# Patient Record
Sex: FEMALE | Race: WHITE | Employment: FULL TIME | URBAN - METROPOLITAN AREA
[De-identification: names, ages, dates, MRNs, and addresses within clinical notes are randomized per-mention and may not be internally consistent; named-entity substitution may affect disease eponyms.]

---

## 2024-04-03 ENCOUNTER — TELEPHONE (OUTPATIENT)
Dept: NEUROSURGERY | Facility: CLINIC | Age: 34
End: 2024-04-03

## 2024-06-13 ENCOUNTER — APPOINTMENT (OUTPATIENT)
Dept: CARDIOLOGY | Facility: HOSPITAL | Age: 34
End: 2024-06-13
Payer: MEDICARE

## 2024-06-13 ENCOUNTER — HOSPITAL ENCOUNTER (EMERGENCY)
Facility: HOSPITAL | Age: 34
Discharge: HOME | End: 2024-06-13
Attending: INTERNAL MEDICINE
Payer: MEDICARE

## 2024-06-13 VITALS
TEMPERATURE: 96.3 F | WEIGHT: 230 LBS | DIASTOLIC BLOOD PRESSURE: 65 MMHG | RESPIRATION RATE: 22 BRPM | HEIGHT: 63 IN | BODY MASS INDEX: 40.75 KG/M2 | HEART RATE: 93 BPM | OXYGEN SATURATION: 96 % | SYSTOLIC BLOOD PRESSURE: 129 MMHG

## 2024-06-13 DIAGNOSIS — M54.12 RIGHT CERVICAL RADICULOPATHY: ICD-10-CM

## 2024-06-13 DIAGNOSIS — M79.601 RIGHT ARM PAIN: Primary | ICD-10-CM

## 2024-06-13 PROCEDURE — 2500000004 HC RX 250 GENERAL PHARMACY W/ HCPCS (ALT 636 FOR OP/ED): Performed by: INTERNAL MEDICINE

## 2024-06-13 PROCEDURE — 93005 ELECTROCARDIOGRAM TRACING: CPT

## 2024-06-13 PROCEDURE — 96372 THER/PROPH/DIAG INJ SC/IM: CPT | Performed by: INTERNAL MEDICINE

## 2024-06-13 PROCEDURE — 99283 EMERGENCY DEPT VISIT LOW MDM: CPT

## 2024-06-13 RX ORDER — DIAZEPAM 5 MG/ML
5 INJECTION, SOLUTION INTRAMUSCULAR; INTRAVENOUS ONCE
Status: COMPLETED | OUTPATIENT
Start: 2024-06-13 | End: 2024-06-13

## 2024-06-13 RX ORDER — PREDNISONE 50 MG/1
50 TABLET ORAL DAILY
Qty: 5 TABLET | Refills: 0 | Status: SHIPPED | OUTPATIENT
Start: 2024-06-13 | End: 2024-06-18

## 2024-06-13 RX ORDER — KETOROLAC TROMETHAMINE 30 MG/ML
30 INJECTION, SOLUTION INTRAMUSCULAR; INTRAVENOUS ONCE
Status: COMPLETED | OUTPATIENT
Start: 2024-06-13 | End: 2024-06-13

## 2024-06-13 RX ORDER — METHOCARBAMOL 500 MG/1
1000 TABLET, FILM COATED ORAL 3 TIMES DAILY PRN
Qty: 20 TABLET | Refills: 0 | Status: SHIPPED | OUTPATIENT
Start: 2024-06-13

## 2024-06-13 RX ORDER — ACETAMINOPHEN 325 MG/1
975 TABLET ORAL ONCE
Status: COMPLETED | OUTPATIENT
Start: 2024-06-13 | End: 2024-06-13

## 2024-06-13 RX ADMIN — ACETAMINOPHEN 975 MG: 325 TABLET ORAL at 16:52

## 2024-06-13 RX ADMIN — KETOROLAC TROMETHAMINE 30 MG: 30 INJECTION, SOLUTION INTRAMUSCULAR at 16:53

## 2024-06-13 RX ADMIN — DIAZEPAM 5 MG: 5 INJECTION, SOLUTION INTRAMUSCULAR; INTRAVENOUS at 16:56

## 2024-06-13 ASSESSMENT — PAIN DESCRIPTION - ONSET: ONSET: SUDDEN

## 2024-06-13 ASSESSMENT — COLUMBIA-SUICIDE SEVERITY RATING SCALE - C-SSRS
2. HAVE YOU ACTUALLY HAD ANY THOUGHTS OF KILLING YOURSELF?: NO
6. HAVE YOU EVER DONE ANYTHING, STARTED TO DO ANYTHING, OR PREPARED TO DO ANYTHING TO END YOUR LIFE?: NO
1. IN THE PAST MONTH, HAVE YOU WISHED YOU WERE DEAD OR WISHED YOU COULD GO TO SLEEP AND NOT WAKE UP?: NO

## 2024-06-13 ASSESSMENT — PAIN DESCRIPTION - PAIN TYPE: TYPE: ACUTE PAIN

## 2024-06-13 ASSESSMENT — PAIN - FUNCTIONAL ASSESSMENT: PAIN_FUNCTIONAL_ASSESSMENT: 0-10

## 2024-06-13 ASSESSMENT — PAIN DESCRIPTION - ORIENTATION: ORIENTATION: LEFT

## 2024-06-13 ASSESSMENT — PAIN DESCRIPTION - LOCATION: LOCATION: ARM

## 2024-06-13 ASSESSMENT — PAIN DESCRIPTION - PROGRESSION: CLINICAL_PROGRESSION: NOT CHANGED

## 2024-06-13 ASSESSMENT — PAIN DESCRIPTION - DESCRIPTORS: DESCRIPTORS: BURNING;SHARP

## 2024-06-13 ASSESSMENT — PAIN SCALES - GENERAL: PAINLEVEL_OUTOF10: 10 - WORST POSSIBLE PAIN

## 2024-06-13 ASSESSMENT — PAIN DESCRIPTION - FREQUENCY: FREQUENCY: CONSTANT/CONTINUOUS

## 2024-06-13 NOTE — ED TRIAGE NOTES
Patient states ever since she took her Seroquel last night her left arm is in extreme pain, burning with sharp pains

## 2024-06-13 NOTE — ED PROVIDER NOTES
HPI   Chief Complaint   Patient presents with    Arm Injury     Patient states ever since she took her Seroquel last night her left arm is in extreme pain, burning with sharp pains       Patient presenting for evaluation of right arm pain.  Patient states she woke up around 5 AM with pain shooting down her right shoulder.  Patient states she feels it rating down to her right hand.  Patient denies recent falls or accidents.  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  Patient notes the pain is improved by laying her right arm down to her side.  Patient denies recent illness.  Denies chest pain.      History provided by:  Patient                      [place] Coma Scale Score: 15                     Patient History   Past Medical History:   Diagnosis Date    Personal history of other diseases of the musculoskeletal system and connective tissue 09/23/2017    History of low back pain     No past surgical history on file.  No family history on file.  Social History     Tobacco Use    Smoking status: Not on file    Smokeless tobacco: Not on file   Substance Use Topics    Alcohol use: Not on file    Drug use: Not on file       Physical Exam   ED Triage Vitals [06/13/24 1607]   Temperature Heart Rate Respirations BP   35.7 °C (96.3 °F) 93 (!) 22 129/65      Pulse Ox Temp Source Heart Rate Source Patient Position   96 % Skin Monitor Sitting      BP Location FiO2 (%)     Left arm --       Physical Exam  Vitals and nursing note reviewed.   Constitutional:       Appearance: Normal appearance.   HENT:      Head: Atraumatic.      Right Ear: External ear normal.      Left Ear: External ear normal.      Nose: Nose normal.      Mouth/Throat:      Mouth: Mucous membranes are moist.      Dentition: Abnormal dentition.   Eyes:      Extraocular Movements: Extraocular movements intact.      Pupils: Pupils are equal, round, and reactive to light.   Cardiovascular:      Rate and Rhythm: Normal rate and  regular rhythm.      Pulses: Normal pulses.   Pulmonary:      Effort: Pulmonary effort is normal.      Breath sounds: Normal breath sounds.   Abdominal:      Palpations: Abdomen is soft.      Tenderness: There is no abdominal tenderness.   Musculoskeletal:         General: No tenderness. Normal range of motion.      Cervical back: Normal range of motion and neck supple. Spasms present. No rigidity, tenderness or bony tenderness.      Thoracic back: No tenderness or bony tenderness.      Lumbar back: No bony tenderness.   Skin:     General: Skin is warm and dry.   Neurological:      General: No focal deficit present.      Mental Status: She is alert and oriented to person, place, and time. Mental status is at baseline.   Psychiatric:         Mood and Affect: Mood is anxious.         ED Course & MDM   ED Course as of 06/13/24 1843   Thu Jun 13, 2024   1746 Reevaluated patient.  Patient is resting comfortably.  Pain improved.  Patient indicates she is supposed to follow-up with neurosurgery because she has known nerve damage throughout her spine.  Patient agrees to follow-up with neurosurgery and return to ED if having worsening symptoms or other concerns. [JA]      ED Course User Index  [JA] Jeffrey Ruvalcaba DO         Diagnoses as of 06/13/24 1843   Right arm pain   Right cervical radiculopathy       Medical Decision Making  Differential diagnosis: Radiculopathy, muscle strain, MI, CVA, other    Patient presented for right arm pain.  Patient woke up with right arm pain shooting down her right arm to right hand.  Patient denies recent trauma.  Patient denies sleeping funny.  Denies recent illness.  Patient denies any possible pregnancy.  Patient has no neurodeficit on exam.  Sensation intact bilaterally.  Motor function is normal bilaterally.  Patient has known degenerative disc disease throughout her spine.  Pain improved in the ED with Toradol, acetaminophen, Valium.    On repeat exam patient continues to have no  neurodeficit.  Unclear etiology of pain although it is likely radicular pain.  No infectious source found in the ED.  Neurovascular intact distally in all 4 extremities.  Patient has follow-up with neurosurgery planned.  Provided follow-up information for neurosurgery again.  Will treat outpatient with prednisone and Robaxin for short course.  Patient agrees to return to ED if having worsening symptoms or other concerns.        Procedure  ECG 12 lead    Performed by: Jeffrey Ruvalcaba DO  Authorized by: Jeffrey Ruvalcaba DO    ECG interpreted by ED Physician in the absence of a cardiologist: yes    Comments:      6/13/2024 17: 05 sinus rhythm, rate 81, normal axis, ST segments normal, T waves normal, normal EKG.  EKG interpreted by myself.       Jeffrey Ruvalcaba DO  06/13/24 0244

## 2024-06-20 LAB
ATRIAL RATE: 81 BPM
P AXIS: 17 DEGREES
P OFFSET: 191 MS
P ONSET: 148 MS
PR INTERVAL: 134 MS
Q ONSET: 215 MS
QRS COUNT: 14 BEATS
QRS DURATION: 102 MS
QT INTERVAL: 396 MS
QTC CALCULATION(BAZETT): 460 MS
QTC FREDERICIA: 437 MS
R AXIS: 58 DEGREES
T AXIS: 44 DEGREES
T OFFSET: 413 MS
VENTRICULAR RATE: 81 BPM

## 2024-07-12 ENCOUNTER — APPOINTMENT (OUTPATIENT)
Dept: CARDIOLOGY | Facility: HOSPITAL | Age: 34
End: 2024-07-12
Payer: MEDICARE

## 2024-07-12 ENCOUNTER — APPOINTMENT (OUTPATIENT)
Dept: RADIOLOGY | Facility: HOSPITAL | Age: 34
End: 2024-07-12
Payer: MEDICARE

## 2024-07-12 ENCOUNTER — HOSPITAL ENCOUNTER (EMERGENCY)
Facility: HOSPITAL | Age: 34
Discharge: HOME | End: 2024-07-13
Attending: STUDENT IN AN ORGANIZED HEALTH CARE EDUCATION/TRAINING PROGRAM
Payer: MEDICARE

## 2024-07-12 DIAGNOSIS — F19.929 DRUG INTOXICATION WITH COMPLICATION (MULTI): ICD-10-CM

## 2024-07-12 DIAGNOSIS — R41.82 ALTERED MENTAL STATUS, UNSPECIFIED ALTERED MENTAL STATUS TYPE: Primary | ICD-10-CM

## 2024-07-12 LAB
ALBUMIN SERPL BCP-MCNC: 4.4 G/DL (ref 3.4–5)
ALP SERPL-CCNC: 77 U/L (ref 33–110)
ALT SERPL W P-5'-P-CCNC: 20 U/L (ref 7–45)
AMPHETAMINES UR QL SCN: ABNORMAL
ANION GAP SERPL CALC-SCNC: 22 MMOL/L (ref 10–20)
APAP SERPL-MCNC: <10 UG/ML
AST SERPL W P-5'-P-CCNC: 27 U/L (ref 9–39)
B-HCG SERPL-ACNC: <2 MIU/ML
BARBITURATES UR QL SCN: ABNORMAL
BASOPHILS # BLD AUTO: 0.05 X10*3/UL (ref 0–0.1)
BASOPHILS NFR BLD AUTO: 0.5 %
BENZODIAZ UR QL SCN: ABNORMAL
BILIRUB SERPL-MCNC: 0.8 MG/DL (ref 0–1.2)
BUN SERPL-MCNC: 21 MG/DL (ref 6–23)
BZE UR QL SCN: ABNORMAL
CALCIUM SERPL-MCNC: 8.9 MG/DL (ref 8.6–10.3)
CANNABINOIDS UR QL SCN: ABNORMAL
CHLORIDE SERPL-SCNC: 99 MMOL/L (ref 98–107)
CK SERPL-CCNC: 299 U/L (ref 0–215)
CO2 SERPL-SCNC: 19 MMOL/L (ref 21–32)
CREAT SERPL-MCNC: 2.11 MG/DL (ref 0.5–1.05)
EGFRCR SERPLBLD CKD-EPI 2021: 31 ML/MIN/1.73M*2
EOSINOPHIL # BLD AUTO: 0.02 X10*3/UL (ref 0–0.7)
EOSINOPHIL NFR BLD AUTO: 0.2 %
ERYTHROCYTE [DISTWIDTH] IN BLOOD BY AUTOMATED COUNT: 14.8 % (ref 11.5–14.5)
ETHANOL SERPL-MCNC: <10 MG/DL
FENTANYL+NORFENTANYL UR QL SCN: ABNORMAL
GLUCOSE SERPL-MCNC: 149 MG/DL (ref 74–99)
HCT VFR BLD AUTO: 35 % (ref 36–46)
HGB BLD-MCNC: 11.4 G/DL (ref 12–16)
IMM GRANULOCYTES # BLD AUTO: 0.1 X10*3/UL (ref 0–0.7)
IMM GRANULOCYTES NFR BLD AUTO: 1 % (ref 0–0.9)
LYMPHOCYTES # BLD AUTO: 1.66 X10*3/UL (ref 1.2–4.8)
LYMPHOCYTES NFR BLD AUTO: 16.5 %
MAGNESIUM SERPL-MCNC: 2.08 MG/DL (ref 1.6–2.4)
MCH RBC QN AUTO: 26.1 PG (ref 26–34)
MCHC RBC AUTO-ENTMCNC: 32.6 G/DL (ref 32–36)
MCV RBC AUTO: 80 FL (ref 80–100)
METHADONE UR QL SCN: ABNORMAL
MONOCYTES # BLD AUTO: 1.14 X10*3/UL (ref 0.1–1)
MONOCYTES NFR BLD AUTO: 11.3 %
NEUTROPHILS # BLD AUTO: 7.08 X10*3/UL (ref 1.2–7.7)
NEUTROPHILS NFR BLD AUTO: 70.5 %
NRBC BLD-RTO: 0 /100 WBCS (ref 0–0)
OPIATES UR QL SCN: ABNORMAL
OXYCODONE+OXYMORPHONE UR QL SCN: ABNORMAL
PCP UR QL SCN: ABNORMAL
PLATELET # BLD AUTO: 375 X10*3/UL (ref 150–450)
POTASSIUM SERPL-SCNC: 3.5 MMOL/L (ref 3.5–5.3)
PROT SERPL-MCNC: 7.5 G/DL (ref 6.4–8.2)
RBC # BLD AUTO: 4.36 X10*6/UL (ref 4–5.2)
SALICYLATES SERPL-MCNC: <3 MG/DL
SODIUM SERPL-SCNC: 136 MMOL/L (ref 136–145)
WBC # BLD AUTO: 10.1 X10*3/UL (ref 4.4–11.3)

## 2024-07-12 PROCEDURE — 80143 DRUG ASSAY ACETAMINOPHEN: CPT | Performed by: STUDENT IN AN ORGANIZED HEALTH CARE EDUCATION/TRAINING PROGRAM

## 2024-07-12 PROCEDURE — 93005 ELECTROCARDIOGRAM TRACING: CPT

## 2024-07-12 PROCEDURE — 70450 CT HEAD/BRAIN W/O DYE: CPT

## 2024-07-12 PROCEDURE — 83735 ASSAY OF MAGNESIUM: CPT

## 2024-07-12 PROCEDURE — 2500000004 HC RX 250 GENERAL PHARMACY W/ HCPCS (ALT 636 FOR OP/ED)

## 2024-07-12 PROCEDURE — 96372 THER/PROPH/DIAG INJ SC/IM: CPT

## 2024-07-12 PROCEDURE — 80307 DRUG TEST PRSMV CHEM ANLYZR: CPT

## 2024-07-12 PROCEDURE — 96360 HYDRATION IV INFUSION INIT: CPT | Mod: 59

## 2024-07-12 PROCEDURE — 85025 COMPLETE CBC W/AUTO DIFF WBC: CPT

## 2024-07-12 PROCEDURE — 36415 COLL VENOUS BLD VENIPUNCTURE: CPT

## 2024-07-12 PROCEDURE — 96361 HYDRATE IV INFUSION ADD-ON: CPT

## 2024-07-12 PROCEDURE — 84702 CHORIONIC GONADOTROPIN TEST: CPT | Performed by: STUDENT IN AN ORGANIZED HEALTH CARE EDUCATION/TRAINING PROGRAM

## 2024-07-12 PROCEDURE — 80320 DRUG SCREEN QUANTALCOHOLS: CPT | Performed by: STUDENT IN AN ORGANIZED HEALTH CARE EDUCATION/TRAINING PROGRAM

## 2024-07-12 PROCEDURE — 72125 CT NECK SPINE W/O DYE: CPT | Performed by: RADIOLOGY

## 2024-07-12 PROCEDURE — 70450 CT HEAD/BRAIN W/O DYE: CPT | Performed by: RADIOLOGY

## 2024-07-12 PROCEDURE — 72125 CT NECK SPINE W/O DYE: CPT

## 2024-07-12 PROCEDURE — 99285 EMERGENCY DEPT VISIT HI MDM: CPT | Mod: 25

## 2024-07-12 PROCEDURE — 82550 ASSAY OF CK (CPK): CPT | Performed by: STUDENT IN AN ORGANIZED HEALTH CARE EDUCATION/TRAINING PROGRAM

## 2024-07-12 PROCEDURE — 80053 COMPREHEN METABOLIC PANEL: CPT

## 2024-07-12 PROCEDURE — 36415 COLL VENOUS BLD VENIPUNCTURE: CPT | Performed by: STUDENT IN AN ORGANIZED HEALTH CARE EDUCATION/TRAINING PROGRAM

## 2024-07-12 PROCEDURE — 2500000004 HC RX 250 GENERAL PHARMACY W/ HCPCS (ALT 636 FOR OP/ED): Performed by: STUDENT IN AN ORGANIZED HEALTH CARE EDUCATION/TRAINING PROGRAM

## 2024-07-12 RX ORDER — MIDAZOLAM HYDROCHLORIDE 5 MG/ML
5 INJECTION INTRAMUSCULAR; INTRAVENOUS ONCE
Status: COMPLETED | OUTPATIENT
Start: 2024-07-12 | End: 2024-07-12

## 2024-07-12 RX ORDER — HALOPERIDOL 5 MG/ML
INJECTION INTRAMUSCULAR
Status: COMPLETED
Start: 2024-07-12 | End: 2024-07-12

## 2024-07-12 RX ORDER — MIDAZOLAM HYDROCHLORIDE 5 MG/ML
INJECTION INTRAMUSCULAR; INTRAVENOUS
Status: COMPLETED
Start: 2024-07-12 | End: 2024-07-12

## 2024-07-12 RX ORDER — HALOPERIDOL 5 MG/ML
5 INJECTION INTRAMUSCULAR ONCE
Status: COMPLETED | OUTPATIENT
Start: 2024-07-12 | End: 2024-07-12

## 2024-07-12 RX ADMIN — MIDAZOLAM 5 MG: 5 INJECTION INTRAMUSCULAR; INTRAVENOUS at 19:19

## 2024-07-12 RX ADMIN — SODIUM CHLORIDE, POTASSIUM CHLORIDE, SODIUM LACTATE AND CALCIUM CHLORIDE 1000 ML: 600; 310; 30; 20 INJECTION, SOLUTION INTRAVENOUS at 19:11

## 2024-07-12 RX ADMIN — SODIUM CHLORIDE, POTASSIUM CHLORIDE, SODIUM LACTATE AND CALCIUM CHLORIDE 1000 ML: 600; 310; 30; 20 INJECTION, SOLUTION INTRAVENOUS at 17:06

## 2024-07-12 RX ADMIN — HALOPERIDOL LACTATE 5 MG: 5 INJECTION, SOLUTION INTRAMUSCULAR at 16:55

## 2024-07-12 RX ADMIN — MIDAZOLAM HYDROCHLORIDE 5 MG: 5 INJECTION INTRAMUSCULAR; INTRAVENOUS at 19:19

## 2024-07-12 RX ADMIN — HALOPERIDOL 5 MG: 5 INJECTION INTRAMUSCULAR at 16:55

## 2024-07-12 SDOH — HEALTH STABILITY: MENTAL HEALTH: HALLUCINATION: VISUAL

## 2024-07-12 SDOH — HEALTH STABILITY: MENTAL HEALTH: CONTENT: UNABLE TO ASSESS

## 2024-07-12 SDOH — HEALTH STABILITY: MENTAL HEALTH: BEHAVIORS/MOOD: CRYING;HALLUCINATIONS;RESTLESS

## 2024-07-12 ASSESSMENT — LIFESTYLE VARIABLES
HAVE YOU EVER FELT YOU SHOULD CUT DOWN ON YOUR DRINKING: NO
EVER HAD A DRINK FIRST THING IN THE MORNING TO STEADY YOUR NERVES TO GET RID OF A HANGOVER: NO
EVER FELT BAD OR GUILTY ABOUT YOUR DRINKING: NO
TOTAL SCORE: 0
HAVE PEOPLE ANNOYED YOU BY CRITICIZING YOUR DRINKING: NO

## 2024-07-12 NOTE — ED NOTES
Pt unable to follow commands and trying to get out of bed   5mg versed IM given     Yifan Alexandra RN  07/12/24 1921

## 2024-07-12 NOTE — ED PROVIDER NOTES
Emergency Department Provider Note          History of Present Illness     CC: Altered Mental Status     HPI: Patient is a 34-year-old female with history of ADHD, anxiety, bipolar, intracranial aneurysm, depression, PTSD, substance use disorder, presenting to the emergency department today for altered mental status.  Per EMS report, they were called by family to the scene who were concerned that she was unresponsive to sternal rub. On arrival they administered 2 mg Narcan without much improvement.  Brought her to the hospital today for evaluation.  Was protecting airway en route saturating 94% .  On arrival to the emergency department, patient is moving all 4 extremities appropriately, not responding to commands.  Unable to complete full review of systems due to patient's intoxicated status..  Per EMS report they did find multiple drug paraphernalia including needles, bags of drugs, and pills on her person.    Records Reviewed: Recent available ED and inpatient notes reviewed in EMR.    PMHx/PSHx:  Per HPI.   - has a past medical history of Personal history of other diseases of the musculoskeletal system and connective tissue.  - has no past surgical history on file.  - does not have a problem list on file.    Medications:  Current Outpatient Medications   Medication Instructions    methocarbamol (ROBAXIN) 1,000 mg, oral, 3 times daily PRN        Allergies:  Patient has no known allergies.    Social History:  - Tobacco:  has no history on file for tobacco use.   - Alcohol:  has no history on file for alcohol use.   - Illicit Drugs:  has no history on file for drug use.     ROS:  Per HPI.       Physical Exam     Triage Vitals:  T    HR 96  /62  RR 20  O2 99 % None (Room air)    General: Intoxicated, thrashing in bed.  Not responding to commands.  Head: Normocephalic. Atraumatic.  Neck: No meningismus.  No midline cervical spine tenderness with palpation.  FROM. No gross masses.   Eyes: EOMI. No scleral  icterus or injection.  ENT: Moist mucous membranes, no apparent trauma or lesions.  CV: Regular rhythm. No murmurs, rubs, gallops appreciated. 2+ radial pulses bilaterally.  Resp: Clear to auscultation bilaterally. No respiratory distress.   GI: Soft, non-distended.  No tenderness with palpation.  No rebound tenderness or guarding. No palpable masses.  : No suprapubic or CVA tenderness.  MSK: Full ROM in bilateral upper and lower extremities. No gross step offs or deformities.  EXT: No peripheral edema, contusions, or wounds.  Skin: Warm and dry, no rashes or lesions.  Neuro: Significantly intoxicated, unable to perform thorough neuro examination.          [place] Coma Scale Score: 9                    Medical Decision Making & ED Course     Results: Relevant laboratory and radiographic results were reviewed and independently interpreted by myself.  As necessary, they are commented on in the ED Course.    EKG: EKG interpreted by myself. Please see ED Course for full interpretation.    Medical Decision Making   Patient is a 34-year-old female presenting to the emergency department today with chief complaint of unresponsiveness.  On arrival, vital signs within normal limits, saturating at 94% on 2 L nasal cannula.  On examination, patient appears acutely altered but moving all 4 extremities appropriately.  Will get basic labs including CBC, CMP, EKG and CT head for further evaluation.  Differential diagnosis includes intoxication with opioid/PCP/benzos versus intracranial hemorrhage/aneurysm versus metabolic encephalopathy versus tox exposure.    Update: Labs notable for a white count of 10.1, hemoglobin stable 11.4.  Not pregnant.  Acute tox panel negative.  Chemistries notable for creatinine at 2.11 consistent with ALISHA today (likely in the setting of dehydration + drug overdose).  Patient does have a mild anion gap to 22, likely in the setting of intoxication/dehydration.  CK also notably elevated at 299.  Given  Haldol in the emergency department for ongoing agitation.  Subsequent EKG performed showed QTc prolonged at 561, will hold off on further QT prolonging agents at this time. Started on 1 L LR for elevated CK level.  Patient signed out pending CT of the cervical spine and head as well as sober reassessment for final disposition.      ED Course as of 07/12/24 2157 Fri Jul 12, 2024   6923 Interpreted by the Emergency Department Attending: ECG revealed normal sinus rhythm at a rate of 92 beats per minute with CO interval 150 , QRS of 127 , QTc of 583.  No acute injury pattern. Previous EKG on June 13 revealed nonspecific changes.    [MG]      ED Course User Index  [MG] Sergio Campos,          Diagnoses as of 07/12/24 2157   Altered mental status, unspecified altered mental status type   Drug intoxication with complication (Multi)     Social Determinants Limiting Care:  None identified        Procedures ? SmartLinks last updated 7/12/2024 9:57 PM        Lonnie Boyer MD  Resident  07/12/24 6503    I did evaluate the patient independently from the resident and was present for key medical decision making.  Agree with disposition.  Any discrepancies between residents findings are detailed below.    In short this is a 39-year-old female presenting to the emergency department for altered mental status.  EMS reports that patient was found by her boyfriend with sonorous respirations on the floor.  Last known well was a few hours prior to EMS being called.  Boyfriend does report that patient does have history of methamphetamine use.  He states that she has not used in quite some time.  In route EMS did provide 2 mg Narcan without any significant change in mentation.  Patient is unable to provide any meaningful history at this time.  Boyfriend denies any reported suicidal or homicidal discussions recently with the patient.  EMS reports a handful of unknown pills as well as a bag filled with unknown drug paraphernalia  with the patient.    VS: As documented in the triage note from today's date and EMR flowsheet were reviewed.  Gen: Obese.  No acute distress.  GCS 9, eyes +2, verbal +2, motor +5.  Skin: Warm. Dry. Intact. No rashes or lesions.  Eyes: Pupils equally round and reactive to light. Clear sclera.   HENT: Atraumatic appearance. Mucosal membranes moist. No oral lesions, uvula midline, airway patent.  TMs clear bilaterally nares clear bilaterally no midline cervical tenderness or step-offs no evidence of basilar skull fracture trachea midline.  CV: Regular rate and regular rhythm. S1, S2. No pedal edema. Warm extremities.  Resp: Nonlabored breathing Clear to auscultation bilaterally. No increased work of breathing.   GI: Soft and nontender. No rebound or guarding. Bowel sounds x4 present.   MSK: Symmetric muscle bulk. No joint swelling in the extremities. Compartments are soft. Neurovascularly intact x4 extremities. Radial pulses +2 equal bilaterally.  Pedal pulses +2 equal bilaterally.  Neuro: GCS 9.  Psych: Disheveled    Patient arrives vitally stable.  Patient's GCS 9 no indication for intubation protecting airway.  Clinically she appears to be intoxicated on some type of drug.  She was a danger to herself as well as others not cooperative with medical care.  Lacks capacity.  Soft restraints were ordered as well as Haldol for agitation.  EMS EKG reviewed no QT prolongation.  Minimal elevation in CK not consistent with rhabdo.  Acute tox is negative.  Patient is not pregnant.  No significant electrolyte derangements.  Creatinine is elevated consistent with ALISHA was hydrated appropriately.  Remains vitally stable.  Patient does have multiple drugs on board including fentanyl as well as amphetamine.  Boyfriend does report that patient does take benzodiazepines as prescribed.  There is no reported potential overdose.  Nor does the boyfriend report any suicidal ideations.  CT imaging the head and neck is unremarkable for  "intracranial hemorrhage or fracture.  EKG without acute injury pattern.  Patient signed out to the oncoming physician for reevaluation and EPAT evaluation.    Sergio Campos, DO       Capacity Assessment Tool    \"Capacity\" is the \"ability\" to make a decision.  The decision in question must be specific (one decision), relevant to a patient's current condition (appropriate), and timely (neither prospective nor retrospective).    Capacity varies based on knowledge base (explanation/understanding of clinical information), cognitive processing, acute psychiatric illness, and other clinical conditions.    In order to be deemed \"capacitated\" to make a single decision at one point in time, a patient must demonstrate all 4 of the following elements:    *Ability to consistently communicate a choice (consistent over time with adequate information)  *Ability to understand the relevant information (accurate knowledge of condition)  *Ability to appreciate the situation and its consequences (risks/benefits, pros/cons)  *Ability to reason about treatment options (without undue influence of a person or condition, eg. suicidality or acute psychosis)      Current Decision    Clinical issue:   AMS Intoxication    Did the appropriate team address relevant information with the patient:  yes    If \"NO\" is selected for appropriate team, then please discuss with the appropriate team.  The appropriate team should be encouraged to address relevant information with the patient AND reevaluate capacity when appropriate.    Capacity Evaluation    Patient demonstrates ability to consistently communicate choice:  No     Patient demonstrates ability to understand the relevant information:  No     Patient demonstrates ability to appreciate the situation and its consequences:  No     Patient demonstrates ability to reason about treatment options:  No    If ANY of the above items are answered \"NO,\" the patient LACKS CAPACITY for that specific " decision at hand, at that specific time.  Further capacity evaluations can be done as needed.         Sergio Campos,   07/12/24 6370       Sergio Campos,   07/13/24 0601

## 2024-07-12 NOTE — ED NOTES
Pt back from Ct   On monitor and in bed   Boyfriend at bedside      Yifan Alexandra RN  07/12/24 1958

## 2024-07-12 NOTE — ED TRIAGE NOTES
Pt presents from home following unresponsive episode . Boyfriend calls EMS multiple hours after pt was found to be on ground with altered mental status.    Drugs on scene and pt per ems. IN narcan given PTA    Pt impulsive upon arrival

## 2024-07-13 VITALS
TEMPERATURE: 97.3 F | HEART RATE: 98 BPM | SYSTOLIC BLOOD PRESSURE: 117 MMHG | DIASTOLIC BLOOD PRESSURE: 66 MMHG | OXYGEN SATURATION: 98 % | RESPIRATION RATE: 18 BRPM

## 2024-07-13 RX ORDER — ASENAPINE 10 MG/1
2 TABLET SUBLINGUAL NIGHTLY
COMMUNITY

## 2024-07-13 RX ORDER — DEXTROAMPHETAMINE SACCHARATE, AMPHETAMINE ASPARTATE MONOHYDRATE, DEXTROAMPHETAMINE SULFATE AND AMPHETAMINE SULFATE 7.5; 7.5; 7.5; 7.5 MG/1; MG/1; MG/1; MG/1
60 CAPSULE, EXTENDED RELEASE ORAL EVERY MORNING
COMMUNITY

## 2024-07-13 RX ORDER — QUETIAPINE FUMARATE 300 MG/1
600 TABLET, FILM COATED ORAL NIGHTLY
COMMUNITY

## 2024-07-13 RX ORDER — VORTIOXETINE 5 MG/1
1 TABLET, FILM COATED ORAL EVERY MORNING
COMMUNITY

## 2024-07-13 RX ORDER — CLONAZEPAM 2 MG/1
1 TABLET ORAL 2 TIMES DAILY PRN
COMMUNITY

## 2024-07-13 SDOH — HEALTH STABILITY: MENTAL HEALTH: SUICIDAL BEHAVIOR (LIFETIME): NO

## 2024-07-13 SDOH — HEALTH STABILITY: MENTAL HEALTH: HAVE YOU WISHED YOU WERE DEAD OR WISHED YOU COULD GO TO SLEEP AND NOT WAKE UP?: NO

## 2024-07-13 SDOH — HEALTH STABILITY: MENTAL HEALTH: IN THE PAST WEEK, HAVE YOU BEEN HAVING THOUGHTS ABOUT KILLING YOURSELF?: NO

## 2024-07-13 SDOH — HEALTH STABILITY: MENTAL HEALTH: NON-SPECIFIC ACTIVE SUICIDAL THOUGHTS (PAST 1 MONTH): NO

## 2024-07-13 SDOH — HEALTH STABILITY: MENTAL HEALTH: IN THE PAST FEW WEEKS, HAVE YOU WISHED YOU WERE DEAD?: NO

## 2024-07-13 SDOH — HEALTH STABILITY: MENTAL HEALTH

## 2024-07-13 SDOH — HEALTH STABILITY: MENTAL HEALTH: HAVE YOU ACTUALLY HAD ANY THOUGHTS OF KILLING YOURSELF?: NO

## 2024-07-13 SDOH — ECONOMIC STABILITY: GENERAL

## 2024-07-13 SDOH — HEALTH STABILITY: MENTAL HEALTH: DEPRESSION SYMPTOMS: INCREASED IRRITABILITY;IMPAIRED CONCENTRATION;SLEEP DISTURBANCE

## 2024-07-13 SDOH — HEALTH STABILITY: MENTAL HEALTH: BEHAVIORS/MOOD: SLEEPING

## 2024-07-13 SDOH — ECONOMIC STABILITY: HOUSING INSECURITY: FEELS SAFE LIVING IN HOME: YES

## 2024-07-13 SDOH — HEALTH STABILITY: MENTAL HEALTH: SUICIDE ASSESSMENT: ADULT (C-SSRS)

## 2024-07-13 SDOH — HEALTH STABILITY: MENTAL HEALTH: ANXIETY SYMPTOMS: GENERALIZED

## 2024-07-13 SDOH — HEALTH STABILITY: MENTAL HEALTH: ARE YOU HAVING THOUGHTS OF KILLING YOURSELF RIGHT NOW?: NO

## 2024-07-13 SDOH — HEALTH STABILITY: MENTAL HEALTH: BEHAVIORS/MOOD: VERBAL;COOPERATIVE;CALM

## 2024-07-13 SDOH — HEALTH STABILITY: MENTAL HEALTH: HAVE YOU EVER DONE ANYTHING, STARTED TO DO ANYTHING, OR PREPARED TO DO ANYTHING TO END YOUR LIFE?: NO

## 2024-07-13 SDOH — HEALTH STABILITY: MENTAL HEALTH: WISH TO BE DEAD (PAST 1 MONTH): NO

## 2024-07-13 SDOH — HEALTH STABILITY: MENTAL HEALTH: HAVE YOU EVER TRIED TO KILL YOURSELF?: NO

## 2024-07-13 SDOH — HEALTH STABILITY: MENTAL HEALTH: IN THE PAST FEW WEEKS, HAVE YOU FELT THAT YOU OR YOUR FAMILY WOULD BE BETTER OFF IF YOU WERE DEAD?: NO

## 2024-07-13 SDOH — HEALTH STABILITY: MENTAL HEALTH: NEEDS EXPRESSED: DENIES

## 2024-07-13 ASSESSMENT — LIFESTYLE VARIABLES
PRESCIPTION_ABUSE_PAST_12_MONTHS: NO
SUBSTANCE_ABUSE_PAST_12_MONTHS: NO

## 2024-07-13 NOTE — ED NOTES
Pt sleeping in bed semi bowman position  Pt starting to show signs of understanding and alertness  Pt will be epat when awake      Yifan Alexandra RN  07/12/24 3870

## 2024-07-13 NOTE — ED NOTES
Received pt from rm 30. Pt sleepy but arousable with only crying/moaning sounds. Restless at times.     Mel Taveras RN  07/12/24 7750

## 2024-07-13 NOTE — PROGRESS NOTES
EPAT - Social Work Psychiatric Assessment    Arrival Details  Mode of Arrival: Ambulance  Admission Source: Home  Admission Type: Voluntary  EPAT Assessment Start Date: 07/13/24  EPAT Assessment Start Time: 1131  Name of : Yelena Olivarez LPC    History of Present Illness  Admission Reason: Patient stated that she was found unresposnive after taking her seroquel. Patient also reported that she suspects that she passed out beacuse she hadnt taken her medication in a few days.  HPI: Patient is a 34-year-old female with history of ADHD, anxiety, bipolar, depression, PTSD, substance use disorder, presenting to the emergency department today for altered mental status. Per EMS report, they were called by family to the scene who were concerned that she was unresponsive. On arrival they administered 2 mg Narcan without much improvement On arrival to the emergency patient was not responding to commands.  Unable to complete full review of systems due to patient's intoxicated status.Per EMS report they did find multiple drug paraphernalia including needles, bags of drugs, and pills on her person.    SW Readmission Information   Readmission within 30 Days: No    Psychiatric Symptoms  Anxiety Symptoms: Generalized  Depression Symptoms: Increased irritability, Impaired concentration, Sleep disturbance  [place]ilar Symptoms: No problems reported or observed.    Psychosis Symptoms  Hallucination Type: Visual  Delusion Type: No problems reported or observed.    Additional Symptoms - Adult  Generalized Anxiety Disorder: Excessive anxiety/worry, Irritability, Sleep disturbance, Difficult to control worry  Obsessive Compulsive Disorder: No problems reported or observed.  Panic Attack: Shortness of breath  Post Traumatic Stress Disorder: Irritability, Avoidance of stimuli associated w/ event, Re-living event              Support System: Immediate family    Living Arrangement: Apartment    Home Safety  Feels Safe Living in Home:  Yes  Potentially Unsafe Housing Conditions: Unable to Assess    Income Information  Employment Status for: Patient  Employment Status: Unemployed  Income Source: Disability  Current/Previous Occupation: Other (Comment)  Financial Concerns: None    [place] Service/Education History  Current or Previous  Service: None   Experience: Exposed to hazardous materials, Other (Comment) (Patient reported that she was formerly a .)  Education Level: GED  History of Learning Problems: No    Social/Cultural History  Social History: Unable to Assess    Legal  Legal Considerations: Patient/ Family Ability to Make Healthcare Decisions    Drug Screening  Have you used any substances (canabis, cocaine, heroin, hallucinogens, inhalants, etc.) in the past 12 months?: No  Have you used any prescription drugs other than prescribed in the past 12 months?: No              Orientation  Orientation Level: Oriented X4    General Appearance  Speech Pattern: Slow         Sleep Pattern  Sleep Pattern: Difficulty falling asleep, Disturbed/interrupted sleep    Risk Factors  Self Harm/Suicidal Ideation Plan: Patient denies SI/SH.  Previous Self Harm/Suicidal Plans: No hx reported  Risk Factors: Other (Comment) (Patient reported that she is not always complaint with medications.)  Description of Thoughts/Ideas Leaving Unit Now: No SI/SH    Violence Risk Assessment  Assessment of Violence: None noted  Thoughts of Harm to Others: No    Ability to Assess Risk Screen  Risk Screen - Ability to Assess: Unable to assess  Ask Suicide-Screening Questions  1. In the past few weeks, have you wished you were dead?: No  2. In the past few weeks, have you felt that you or your family would be better off if you were dead?: No  3. In the past week, have you been having thoughts about killing yourself?: No  4. Have you ever tried to kill yourself?: No  5. Are you having thoughts of killing yourself right now?: No  Calculated Risk Score: No  intervention is necessary  [place] Suicide Severity Rating Scale (Screener/Recent Self-Report)  1. Wish to be Dead (Past 1 Month): No  2. Non-Specific Active Suicidal Thoughts (Past 1 Month): No  6. Suicidal Behavior (Lifetime): No  Calculated C-SSRS Risk Score (Lifetime/Recent): No Risk Indicated  Step 1: Risk Factors  Current & Past Psychiatric Dx: PTSD, Mood disorder, Alcohol/substance abuse disorders  Presenting Symptoms: Anxiety and/or panic, Hopelessness or despair  Family History: Other (Comment) (Unable to assess)  Precipitants/Stressors: Chronic physical pain or other acute medical problem (e.g. CNS disorders)  Change in Treatment: Hopeless or dissatisfied with provider or treatment, Non-compliant or not receiving treatment  Access to Lethal Methods : No  Step 2: Protective Factors   Protective Factors Internal: Identifies reasons for living  Protective Factors External: Supportive social network or family or friends  Step 3: Suicidal Ideation Intensity  Most Severe Suicidal Ideation Identified: None reported  Step 5: Documentation  Risk Level: Low suicide risk    Psychiatric Impression and Plan of Care  Assessment and Plan: : The patient was found unresponsive at home and brought to the emergency room. They have a history of PTSD, Bipolar Disorder, and anxiety. Patients chart shows a history of hospitalizations due to psychiatric concerns and drug use. Although patient denies drug use with LPC, urine screen was positive for amphetamines, fetanyl, and Benzodiazepines. Attending physician was consulted- patient will be discharged due to low risk/denial of self harm/suicidal ideations.    Outcome/Disposition  Assessment, Recommendations and Risk Level Reviewed with: Dr. Tyra GALLARDO Assessment Completed Date: 07/13/24  EPAT Assessment Completed Time: 1253  Patient Disposition: Home    Social Work Note

## 2024-07-13 NOTE — PROGRESS NOTES
Transition of care note:  This patient was turned over to me from prior physician.  This is a 34-year-old female who is currently awaiting [facility] evaluation.  Patient had positive tox screen of benzos fentanyl and amphetamines.  Patient was evaluated by [facility] and they feel comfortable with her being discharged home.  Patient denies any suicidal homicidal ideation.  ED Course as of 07/13/24 1222   Fri Jul 12, 2024   1753 Interpreted by the Emergency Department Attending: ECG revealed normal sinus rhythm at a rate of 92 beats per minute with OK interval 150 , QRS of 127 , QTc of 583.  No acute injury pattern. Previous EKG on June 13 revealed nonspecific changes.    [MG]      ED Course User Index  [MG] Sergio Andres,          Diagnoses as of 07/13/24 1222   Altered mental status, unspecified altered mental status type   Drug intoxication with complication (Multi)      Visit Vitals  /62 (BP Location: Right arm, Patient Position: Sitting)   Pulse 97   Temp 36.3 °C (97.3 °F) (Temporal)   Resp 16   LMP 05/16/2024 (Approximate)   SpO2 (!) 93%   OB Status Having periods      Labs Reviewed   CBC WITH AUTO DIFFERENTIAL - Abnormal       Result Value    WBC 10.1      nRBC 0.0      RBC 4.36      Hemoglobin 11.4 (*)     Hematocrit 35.0 (*)     MCV 80      MCH 26.1      MCHC 32.6      RDW 14.8 (*)     Platelets 375      Neutrophils % 70.5      Immature Granulocytes %, Automated 1.0 (*)     Lymphocytes % 16.5      Monocytes % 11.3      Eosinophils % 0.2      Basophils % 0.5      Neutrophils Absolute 7.08      Immature Granulocytes Absolute, Automated 0.10      Lymphocytes Absolute 1.66      Monocytes Absolute 1.14 (*)     Eosinophils Absolute 0.02      Basophils Absolute 0.05     COMPREHENSIVE METABOLIC PANEL - Abnormal    Glucose 149 (*)     Sodium 136      Potassium 3.5      Chloride 99      Bicarbonate 19 (*)     Anion Gap 22 (*)     Urea Nitrogen 21      Creatinine 2.11 (*)     eGFR 31 (*)     Calcium 8.9      Albumin  4.4      Alkaline Phosphatase 77      Total Protein 7.5      AST 27      Bilirubin, Total 0.8      ALT 20     DRUG SCREEN,URINE - Abnormal    Amphetamine Screen, Urine Presumptive Positive (*)     Barbiturate Screen, Urine Presumptive Negative      Benzodiazepines Screen, Urine Presumptive Positive (*)     Cannabinoid Screen, Urine Presumptive Negative      Cocaine Metabolite Screen, Urine Presumptive Negative      Fentanyl Screen, Urine Presumptive Positive (*)     Opiate Screen, Urine Presumptive Negative      Oxycodone Screen, Urine Presumptive Negative      PCP Screen, Urine Presumptive Negative      Methadone Screen, Urine Presumptive Negative      Narrative:     Drug screen results are presumptive and should not be used to assess   compliance with prescribed medication. Contact the performing [facility] laboratory   to add-on definitive confirmatory testing if clinically indicated.    Toxicology screening results are reported qualitatively. The concentration must   be greater than or equal to the cutoff to be reported as positive. The concentration   at which the screening test can detect an individual drug or metabolite varies.   The absence of expected drug(s) and/or drug metabolite(s) may indicate non-compliance,   inappropriate timing of specimen collection relative to drug administration, poor drug   absorption, diluted/adulterated urine, or limitations of testing. For medical purposes   only; not valid for forensic use.    Interpretive questions should be directed to the laboratory medical directors.   CREATINE KINASE - Abnormal    Creatine Kinase 299 (*)    ACUTE TOXICOLOGY PANEL, BLOOD - Normal    Acetaminophen <10.0      Salicylate  <3      Alcohol <10     HUMAN CHORIONIC GONADOTROPIN, SERUM QUANTITATIVE - Normal    HCG, Beta-Quantitative <2      Narrative:      Total HCG measurement is performed using the [employer]   Immunoassay which detects intact HCG and free beta HCG subunit.    This test  is not indicated for use as a tumor marker.   HCG testing is performed using a different test methodology at [facility] [place] than other [facility]. Direct result comparison   should only be made within the same method.       MAGNESIUM - Normal    Magnesium 2.08         1. Altered mental status, unspecified altered mental status type    2. Drug intoxication with complication (Multi)

## 2024-07-13 NOTE — ED NOTES
Pt sleeping in bed with no signs of distress   Pt VSS at this time   Boyfriend at bedside for comfort        Yifan Alexandra RN  07/12/24 2034

## 2024-07-19 LAB
ATRIAL RATE: 92 BPM
P AXIS: 69 DEGREES
PR INTERVAL: 150 MS
Q ONSET: 251 MS
QRS COUNT: 15 BEATS
QRS DURATION: 127 MS
QT INTERVAL: 471 MS
QTC CALCULATION(BAZETT): 583 MS
QTC FREDERICIA: 543 MS
R AXIS: 95 DEGREES
T AXIS: 50 DEGREES
T OFFSET: 486 MS
VENTRICULAR RATE: 92 BPM